# Patient Record
Sex: MALE | Race: OTHER | Employment: OTHER | URBAN - METROPOLITAN AREA
[De-identification: names, ages, dates, MRNs, and addresses within clinical notes are randomized per-mention and may not be internally consistent; named-entity substitution may affect disease eponyms.]

---

## 2017-09-10 ENCOUNTER — APPOINTMENT (OUTPATIENT)
Dept: GENERAL RADIOLOGY | Age: 42
End: 2017-09-10
Attending: NURSE PRACTITIONER
Payer: SELF-PAY

## 2017-09-10 ENCOUNTER — HOSPITAL ENCOUNTER (EMERGENCY)
Age: 42
Discharge: HOME | End: 2017-09-10
Attending: EMERGENCY MEDICINE
Payer: SELF-PAY

## 2017-09-10 VITALS
TEMPERATURE: 98.2 F | RESPIRATION RATE: 18 BRPM | SYSTOLIC BLOOD PRESSURE: 139 MMHG | HEIGHT: 68 IN | HEART RATE: 82 BPM | BODY MASS INDEX: 21.98 KG/M2 | DIASTOLIC BLOOD PRESSURE: 86 MMHG | OXYGEN SATURATION: 100 % | WEIGHT: 145 LBS

## 2017-09-10 DIAGNOSIS — R03.0 ELEVATED BLOOD PRESSURE READING: ICD-10-CM

## 2017-09-10 DIAGNOSIS — L98.499: Primary | ICD-10-CM

## 2017-09-10 DIAGNOSIS — R00.0 TACHYCARDIA: ICD-10-CM

## 2017-09-10 DIAGNOSIS — M79.632 LEFT FOREARM PAIN: ICD-10-CM

## 2017-09-10 LAB
ANION GAP SERPL CALC-SCNC: 8 MMOL/L (ref 3–18)
BASOPHILS # BLD: 0 K/UL (ref 0–0.06)
BASOPHILS NFR BLD: 0 % (ref 0–2)
BUN SERPL-MCNC: 14 MG/DL (ref 7–18)
BUN/CREAT SERPL: 19 (ref 12–20)
CALCIUM SERPL-MCNC: 9.1 MG/DL (ref 8.5–10.1)
CHLORIDE SERPL-SCNC: 101 MMOL/L (ref 100–108)
CO2 SERPL-SCNC: 28 MMOL/L (ref 21–32)
CREAT SERPL-MCNC: 0.75 MG/DL (ref 0.6–1.3)
DIFFERENTIAL METHOD BLD: NORMAL
EOSINOPHIL # BLD: 0.1 K/UL (ref 0–0.4)
EOSINOPHIL NFR BLD: 1 % (ref 0–5)
ERYTHROCYTE [DISTWIDTH] IN BLOOD BY AUTOMATED COUNT: 13.1 % (ref 11.6–14.5)
GLUCOSE SERPL-MCNC: 127 MG/DL (ref 74–99)
HCT VFR BLD AUTO: 40.2 % (ref 36–48)
HGB BLD-MCNC: 14 G/DL (ref 13–16)
LACTATE BLD-SCNC: 1.5 MMOL/L (ref 0.4–2)
LYMPHOCYTES # BLD: 1.9 K/UL (ref 0.9–3.6)
LYMPHOCYTES NFR BLD: 22 % (ref 21–52)
MCH RBC QN AUTO: 29.8 PG (ref 24–34)
MCHC RBC AUTO-ENTMCNC: 34.8 G/DL (ref 31–37)
MCV RBC AUTO: 85.5 FL (ref 74–97)
MONOCYTES # BLD: 0.6 K/UL (ref 0.05–1.2)
MONOCYTES NFR BLD: 6 % (ref 3–10)
NEUTS SEG # BLD: 6.4 K/UL (ref 1.8–8)
NEUTS SEG NFR BLD: 71 % (ref 40–73)
PLATELET # BLD AUTO: 209 K/UL (ref 135–420)
PMV BLD AUTO: 9.9 FL (ref 9.2–11.8)
POTASSIUM SERPL-SCNC: 4.3 MMOL/L (ref 3.5–5.5)
RBC # BLD AUTO: 4.7 M/UL (ref 4.7–5.5)
SODIUM SERPL-SCNC: 137 MMOL/L (ref 136–145)
WBC # BLD AUTO: 9 K/UL (ref 4.6–13.2)

## 2017-09-10 PROCEDURE — 83605 ASSAY OF LACTIC ACID: CPT

## 2017-09-10 PROCEDURE — 77030018836 HC SOL IRR NACL ICUM -A

## 2017-09-10 PROCEDURE — 74011000258 HC RX REV CODE- 258: Performed by: NURSE PRACTITIONER

## 2017-09-10 PROCEDURE — 87186 SC STD MICRODIL/AGAR DIL: CPT | Performed by: NURSE PRACTITIONER

## 2017-09-10 PROCEDURE — 87070 CULTURE OTHR SPECIMN AEROBIC: CPT | Performed by: NURSE PRACTITIONER

## 2017-09-10 PROCEDURE — 87040 BLOOD CULTURE FOR BACTERIA: CPT | Performed by: NURSE PRACTITIONER

## 2017-09-10 PROCEDURE — 73090 X-RAY EXAM OF FOREARM: CPT

## 2017-09-10 PROCEDURE — 96367 TX/PROPH/DG ADDL SEQ IV INF: CPT

## 2017-09-10 PROCEDURE — 85025 COMPLETE CBC W/AUTO DIFF WBC: CPT | Performed by: NURSE PRACTITIONER

## 2017-09-10 PROCEDURE — 74011250636 HC RX REV CODE- 250/636: Performed by: NURSE PRACTITIONER

## 2017-09-10 PROCEDURE — 87077 CULTURE AEROBIC IDENTIFY: CPT | Performed by: NURSE PRACTITIONER

## 2017-09-10 PROCEDURE — 96365 THER/PROPH/DIAG IV INF INIT: CPT

## 2017-09-10 PROCEDURE — 80048 BASIC METABOLIC PNL TOTAL CA: CPT | Performed by: NURSE PRACTITIONER

## 2017-09-10 PROCEDURE — 74011250637 HC RX REV CODE- 250/637: Performed by: NURSE PRACTITIONER

## 2017-09-10 PROCEDURE — 99283 EMERGENCY DEPT VISIT LOW MDM: CPT

## 2017-09-10 PROCEDURE — 96366 THER/PROPH/DIAG IV INF ADDON: CPT

## 2017-09-10 RX ORDER — CIPROFLOXACIN 500 MG/1
500 TABLET ORAL 2 TIMES DAILY
Qty: 20 TAB | Refills: 0 | Status: SHIPPED | OUTPATIENT
Start: 2017-09-10 | End: 2017-09-20

## 2017-09-10 RX ORDER — CIPROFLOXACIN 500 MG/1
500 TABLET ORAL
Status: COMPLETED | OUTPATIENT
Start: 2017-09-10 | End: 2017-09-10

## 2017-09-10 RX ORDER — IBUPROFEN 800 MG/1
800 TABLET ORAL
Qty: 20 TAB | Refills: 0 | Status: SHIPPED | OUTPATIENT
Start: 2017-09-10 | End: 2017-09-17

## 2017-09-10 RX ADMIN — SODIUM CHLORIDE 750 MG: 900 INJECTION, SOLUTION INTRAVENOUS at 14:58

## 2017-09-10 RX ADMIN — CIPROFLOXACIN HYDROCHLORIDE 500 MG: 500 TABLET, FILM COATED ORAL at 14:57

## 2017-09-10 RX ADMIN — CEFAZOLIN SODIUM 1 G: 1 INJECTION, POWDER, FOR SOLUTION INTRAMUSCULAR; INTRAVENOUS at 12:03

## 2017-09-10 RX ADMIN — SODIUM CHLORIDE 1000 MG: 900 INJECTION, SOLUTION INTRAVENOUS at 12:46

## 2017-09-10 NOTE — ED PROVIDER NOTES
HPI Comments: Michelle Urbina is a 43year old male who presents to the ED with a c/o non healing ulceration on the left forearm. Pt states it has been there for over a year. Admtis that it occurred from him skin popping narcotic agents. Adds that it was the same way on the opposing forearm, but it healed up and the left forearm did not. Patient is a 43 y.o. male presenting with abscess. The history is provided by the patient. History limited by: No communication barrier. Abscess    This is a new problem. Episode onset: Pt states the left forearm wound has been there for a year. The problem has not changed since onset. There has been no fever. Affected Location: left forearm. He has tried nothing (Pt has tried self-mangement.  ) for the symptoms. The treatment provided no relief. History reviewed. No pertinent past medical history. Past Surgical History:   Procedure Laterality Date    EGD  6/23/2015              History reviewed. No pertinent family history. Social History     Social History    Marital status: SINGLE     Spouse name: N/A    Number of children: N/A    Years of education: N/A     Occupational History    Not on file. Social History Main Topics    Smoking status: Current Every Day Smoker    Smokeless tobacco: Never Used    Alcohol use No    Drug use: Yes     Special: Marijuana, Heroin      Comment: last used 4/21/15    Sexual activity: Not on file     Other Topics Concern    Not on file     Social History Narrative         ALLERGIES: Review of patient's allergies indicates no known allergies. Review of Systems   Constitutional: Negative. HENT: Negative. Eyes: Negative. Respiratory: Negative. Cardiovascular: Negative. Gastrointestinal: Negative. Endocrine: Negative. Genitourinary: Negative. Musculoskeletal: Negative. Skin: Positive for wound (left forearm). Allergic/Immunologic: Negative. Neurological: Negative.     Hematological: Negative. Psychiatric/Behavioral: Negative. Vitals:    09/10/17 0959   BP: (!) 152/98   Pulse: (!) 110   Resp: 16   Temp: 98.2 °F (36.8 °C)   SpO2: 100%   Weight: 65.8 kg (145 lb)   Height: 5' 8\" (1.727 m)            Physical Exam   Constitutional: He is oriented to person, place, and time. He appears well-developed and well-nourished. No distress. HENT:   Head: Normocephalic and atraumatic. Eyes: Pupils are equal, round, and reactive to light. Neck: Normal range of motion. Neck supple. Cardiovascular: Normal rate and regular rhythm. Pulmonary/Chest: Effort normal and breath sounds normal. He has no wheezes. He has no rales. Abdominal: Soft. Bowel sounds are normal. There is no tenderness. There is no rebound. Genitourinary:   Genitourinary Comments: NE   Musculoskeletal: Normal range of motion. Neurological: He is alert and oriented to person, place, and time. No cranial nerve deficit. He exhibits normal muscle tone. Coordination normal.   Skin: Skin is warm and dry. Large ulcerated area on the left forearm that is not healing. There is evidence of black tissue contained within that may re(present tissue necrosis. This wound occupies the majority surface area of the left anterior forearm. There is no evidence of DC. There is no evidence of primary healing. The right forearm has a smaller and healing ulceration similar to the left forearm. Psychiatric: He has a normal mood and affect. Nursing note and vitals reviewed. MDM  Number of Diagnoses or Management Options  Elevated blood pressure reading:   Left forearm pain:   Skin ulcer of forearm, with unspecified severity ([address]):   Tachycardia:   Diagnosis management comments: CONSULT:    Genny from the laboratory called and stated the Pt has many gram negative rods growing. Landy Galeana, N  12:09 PM    PROGRESS NOTE:  I have discussed admission with the Pt.    I advised him that his XR findings are ominous, and needs to be admitted for IV ABX and care by the wound management team as well as orthopedics. The Pt states that he has social issues at home which preclude him from being admitted. States he has been walking around this way for over a year, and doesn't feel he needs to be admitted now. .  The Pt is capable of making his own rational decision as he is not impaired. I will DC the Pt to home with ABX, and a referral to the wound care clinic here in the hospital.  Charmayne Countryman, NP  3:37 PM           Amount and/or Complexity of Data Reviewed  Clinical lab tests: ordered and reviewed  Tests in the radiology section of CPT®: ordered and reviewed    Risk of Complications, Morbidity, and/or Mortality  Presenting problems: moderate  Diagnostic procedures: moderate  Management options: moderate    Patient Progress  Patient progress: stable    ED Course       Procedures      PROGRESS NOTE:  One or more blood pressure readings were noted elevated during the Pt's presentation in the emergency department this date. This abnormal reading has been cited in the Pt's diagnosis, and they have been encouraged to follow up with their primary care physician, or referred to a consultant for further evaluation and treatment. Charmayne Countryman, NP  3:40 PM    Diagnosis:   1. Skin ulcer of forearm, with unspecified severity ([address]    2. Left forearm pain    3. Elevated blood pressure reading    4. Tachycardia          Disposition:   DEPARTED THE ED AGAINST MEDICAL ADVICE. Follow-up Information     Follow up With Details Comments Contact Info    [address] Call in the morning to make an appointment. [address]  [phone]          Patient's Medications   Start Taking    CIPROFLOXACIN HCL (CIPRO) 500 MG TABLET    Take 1 Tab by mouth two (2) times a day for 10 days. IBUPROFEN (MOTRIN) 800 MG TABLET    Take 1 Tab by mouth every eight (8) hours as needed for Pain for up to 7 days.    Continue Taking No medications on file   These Medications have changed    No medications on file   Stop Taking    PROMETHAZINE (PHENERGAN) 25 MG TABLET    Take 1 Tab by mouth every eight (8) hours as needed.

## 2017-09-10 NOTE — ED NOTES
Abscess to left upper arm. Tissue is black/necrotic with some areas of pink. Purulent drainage noted on patients dressing.

## 2017-09-10 NOTE — DISCHARGE INSTRUCTIONS
[employer] Activation    Thank you for requesting access to [employer]. Please follow the instructions below to securely access and download your online medical record. [employer] allows you to send messages to your doctor, view your test results, renew your prescriptions, schedule appointments, and more. How Do I Sign Up? 1. In your internet browser, go to [email or web address]  2. Click on the First Time User? Click Here link in the Sign In box. You will be redirect to the New Member Sign Up page. 3. Enter your [employer] Access Code exactly as it appears below. You will not need to use this code after youve completed the sign-up process. If you do not sign up before the expiration date, you must request a new code. [employer] Access Code: [email or web address]  Expires: 2017  3:41 PM (This is the date your [employer] access code will )    4. Enter the last four digits of your Social Security Number (xxxx) and Date of Birth (mm/dd/yyyy) as indicated and click Submit. You will be taken to the next sign-up page. 5. Create a [employer] ID. This will be your [employer] login ID and cannot be changed, so think of one that is secure and easy to remember. 6. Create a [employer] password. You can change your password at any time. 7. Enter your Password Reset Question and Answer. This can be used at a later time if you forget your password. 8. Enter your e-mail address. You will receive e-mail notification when new information is available in [address]. 9. Click Sign Up. You can now view and download portions of your medical record. 10. Click the Download Summary menu link to download a portable copy of your medical information. Additional Information    If you have questions, please visit the Frequently Asked Questions section of the [employer] website at [email or web address]/. Remember, [employer] is NOT to be used for urgent needs. For medical emergencies, dial 911. Change dressing daily.   Take antibiotics as prescribed.

## 2017-09-10 NOTE — ED TRIAGE NOTES
Left arm infection x 1 year from IV drug abuse. Swollen and red and warm with foul drainage. ,had one on right arm that healed

## 2017-09-10 NOTE — PROGRESS NOTES
Day #1 of vancomycin  Indication:  osteomyelitis  Current regimen:  1750 mg IV once loading dose    Recent Labs      09/10/17   1110   WBC  9.0   CREA  0.75   BUN  14     Est CrCl: >100 ml/min  Temp (24hrs), Av.2 °F (36.8 °C), Min:98.2 °F (36.8 °C), Max:98.2 °F (36.8 °C)    Cultures: none at this time    Plan: Consult pharmacy for vancomycin maintenance dosing if needed.      Thanks,  Jasmin Smith, PHARMD

## 2017-09-13 LAB
BACTERIA SPEC CULT: ABNORMAL
GRAM STN SPEC: ABNORMAL
GRAM STN SPEC: ABNORMAL
SERVICE CMNT-IMP: ABNORMAL

## 2017-09-14 LAB
BACTERIA SPEC CULT: ABNORMAL
GRAM STN SPEC: ABNORMAL
SERVICE CMNT-IMP: ABNORMAL

## 2017-09-15 NOTE — PROGRESS NOTES
Attempted to call patient, no answer and unable to leave voicemail. Admission recommended at time of discharge, as refused. Positive blood cultures, however sensitivity to Cirpo which is what patient was prescribed. Concern for osteomyelitis  on xray of arm. Adam Walden NP.   Adam Walden, NP

## 2017-09-16 LAB
BACTERIA SPEC CULT: NORMAL
SERVICE CMNT-IMP: NORMAL

## 2017-09-22 ENCOUNTER — HOSPITAL ENCOUNTER (OUTPATIENT)
Dept: WOUND CARE | Age: 42
Discharge: HOME | End: 2017-09-22
Payer: SELF-PAY

## 2017-09-22 VITALS
DIASTOLIC BLOOD PRESSURE: 77 MMHG | OXYGEN SATURATION: 100 % | RESPIRATION RATE: 15 BRPM | HEART RATE: 97 BPM | TEMPERATURE: 97 F | SYSTOLIC BLOOD PRESSURE: 131 MMHG

## 2017-09-22 DIAGNOSIS — T87.89 NONHEALING AMPUTATION STUMP (HCC): ICD-10-CM

## 2017-09-22 PROBLEM — F19.90 IVDU (INTRAVENOUS DRUG USER): Status: ACTIVE | Noted: 2017-09-22

## 2017-09-22 PROBLEM — M86.9 OSTEOMYELITIS (HCC): Status: ACTIVE | Noted: 2017-09-22

## 2017-09-22 PROCEDURE — 77030011256 HC DRSG MEPILEX <16IN NO BORD MOLN -A: Performed by: INTERNAL MEDICINE

## 2017-09-22 PROCEDURE — 11042 DBRDMT SUBQ TIS 1ST 20SQCM/<: CPT

## 2017-09-22 PROCEDURE — 87070 CULTURE OTHR SPECIMN AEROBIC: CPT | Performed by: INTERNAL MEDICINE

## 2017-09-22 PROCEDURE — 74011250637 HC RX REV CODE- 250/637

## 2017-09-22 PROCEDURE — 87077 CULTURE AEROBIC IDENTIFY: CPT | Performed by: INTERNAL MEDICINE

## 2017-09-22 PROCEDURE — 87186 SC STD MICRODIL/AGAR DIL: CPT | Performed by: INTERNAL MEDICINE

## 2017-09-22 PROCEDURE — 74011000250 HC RX REV CODE- 250

## 2017-09-22 RX ORDER — CIPROFLOXACIN 750 MG/1
750 TABLET, FILM COATED ORAL 2 TIMES DAILY
Qty: 84 TAB | Refills: 0 | Status: SHIPPED | OUTPATIENT
Start: 2017-09-22 | End: 2017-11-03

## 2017-09-22 RX ORDER — LIDOCAINE AND PRILOCAINE 25; 25 MG/G; MG/G
CREAM TOPICAL
Status: COMPLETED
Start: 2017-09-22 | End: 2017-09-22

## 2017-09-22 RX ADMIN — Medication: at 13:06

## 2017-09-22 RX ADMIN — LIDOCAINE AND PRILOCAINE 2 TUBE: 25; 25 CREAM TOPICAL at 12:09

## 2017-09-22 NOTE — WOUND CARE
09/22/17 1134   Wound Arm Left   Date First Assessed: 09/22/17   POA: Yes  Location: Arm  Orientation: Left   DRESSING STATUS Removed   DRESSING TYPE Other (Comment)  (Coban,Telfa)   Non-Pressure Injury Full thickness (subcut/muscle)   Wound Length (cm) 8 cm   Wound Width (cm) 2.2 cm   Wound Depth (cm) 0.9   Wound Surface area (cm^3) 15.84 cm^2   Condition of Base Pink;Slough   Condition of Edges Open   Tissue Type Pink;Yellow   Tissue Type Percent Pink 5   Tissue Type Percent Yellow 95   Drainage Amount  Moderate   Drainage Color Purulent   Wound Odor None   Periwound Skin Condition Other (comment)  (Scar)   Cleansing and Cleansing Agents  Dermal wound cleanser   Dressing Type Applied Other (Comment)  (Iodosorb,Mepilex)   Procedure Tolerated Well     Patient given number for sandhya care application. Patient educated on proper way to cleanse wound and apply dressing (iodosorb and bandage), he verbalized an understanding and repeated back proper technique.

## 2017-09-26 LAB
BACTERIA SPEC CULT: ABNORMAL
GRAM STN SPEC: ABNORMAL
SERVICE CMNT-IMP: ABNORMAL

## 2017-09-27 PROBLEM — T87.89 NONHEALING AMPUTATION STUMP (HCC): Status: RESOLVED | Noted: 2017-09-22 | Resolved: 2017-09-27

## 2017-09-27 PROBLEM — T14.8XXA NON-HEALING NON-SURGICAL WOUND: Status: ACTIVE | Noted: 2017-09-27

## 2018-07-01 ENCOUNTER — APPOINTMENT (OUTPATIENT)
Dept: GENERAL RADIOLOGY | Age: 43
End: 2018-07-01
Attending: NURSE PRACTITIONER
Payer: SELF-PAY

## 2018-07-01 ENCOUNTER — HOSPITAL ENCOUNTER (EMERGENCY)
Age: 43
Discharge: HOME | End: 2018-07-02
Attending: EMERGENCY MEDICINE | Admitting: EMERGENCY MEDICINE
Payer: SELF-PAY

## 2018-07-01 VITALS
WEIGHT: 150 LBS | TEMPERATURE: 97.7 F | HEIGHT: 68 IN | SYSTOLIC BLOOD PRESSURE: 146 MMHG | RESPIRATION RATE: 16 BRPM | HEART RATE: 113 BPM | OXYGEN SATURATION: 97 % | BODY MASS INDEX: 22.73 KG/M2 | DIASTOLIC BLOOD PRESSURE: 92 MMHG

## 2018-07-01 DIAGNOSIS — T14.8XXA NON-HEALING NON-SURGICAL WOUND: ICD-10-CM

## 2018-07-01 DIAGNOSIS — L98.493: Primary | ICD-10-CM

## 2018-07-01 LAB — LACTATE BLD-SCNC: 1 MMOL/L (ref 0.4–2)

## 2018-07-01 PROCEDURE — 74011000250 HC RX REV CODE- 250: Performed by: NURSE PRACTITIONER

## 2018-07-01 PROCEDURE — 99283 EMERGENCY DEPT VISIT LOW MDM: CPT

## 2018-07-01 PROCEDURE — 74011000258 HC RX REV CODE- 258: Performed by: NURSE PRACTITIONER

## 2018-07-01 PROCEDURE — 83605 ASSAY OF LACTIC ACID: CPT

## 2018-07-01 PROCEDURE — 73090 X-RAY EXAM OF FOREARM: CPT

## 2018-07-01 PROCEDURE — 96365 THER/PROPH/DIAG IV INF INIT: CPT

## 2018-07-01 PROCEDURE — 74011250636 HC RX REV CODE- 250/636: Performed by: NURSE PRACTITIONER

## 2018-07-01 RX ORDER — IBUPROFEN 800 MG/1
800 TABLET ORAL
Qty: 20 TAB | Refills: 0 | Status: SHIPPED | OUTPATIENT
Start: 2018-07-01 | End: 2018-07-08

## 2018-07-01 RX ORDER — BACITRACIN 500 UNIT/G
10 PACKET (EA) TOPICAL
Status: COMPLETED | OUTPATIENT
Start: 2018-07-01 | End: 2018-07-01

## 2018-07-01 RX ORDER — CEPHALEXIN 500 MG/1
500 CAPSULE ORAL 4 TIMES DAILY
Qty: 56 CAP | Refills: 0 | Status: SHIPPED | OUTPATIENT
Start: 2018-07-01 | End: 2018-07-15

## 2018-07-01 RX ORDER — METHADONE HYDROCHLORIDE 10 MG/ML
50 CONCENTRATE ORAL
COMMUNITY
End: 2020-03-10

## 2018-07-01 RX ADMIN — BACITRACIN 10 PACKET: 500 OINTMENT TOPICAL at 22:30

## 2018-07-01 RX ADMIN — PIPERACILLIN SODIUM,TAZOBACTAM SODIUM 4.5 G: 4; .5 INJECTION, POWDER, FOR SOLUTION INTRAVENOUS at 20:21

## 2018-07-02 NOTE — DISCHARGE INSTRUCTIONS
[employer] Activation    Thank you for requesting access to [employer]. Please follow the instructions below to securely access and download your online medical record. [employer] allows you to send messages to your doctor, view your test results, renew your prescriptions, schedule appointments, and more. How Do I Sign Up? 1. In your internet browser, go to [email or web address]  2. Click on the First Time User? Click Here link in the Sign In box. You will be redirect to the New Member Sign Up page. 3. Enter your [employer] Access Code exactly as it appears below. You will not need to use this code after youve completed the sign-up process. If you do not sign up before the expiration date, you must request a new code. [employer] Access Code: [email or web address]  Expires: 2018  7:10 PM (This is the date your [employer] access code will )    4. Enter the last four digits of your Social Security Number (xxxx) and Date of Birth (mm/dd/yyyy) as indicated and click Submit. You will be taken to the next sign-up page. 5. Create a [employer] ID. This will be your [employer] login ID and cannot be changed, so think of one that is secure and easy to remember. 6. Create a [employer] password. You can change your password at any time. 7. Enter your Password Reset Question and Answer. This can be used at a later time if you forget your password. 8. Enter your e-mail address. You will receive e-mail notification when new information is available in [address]. 9. Click Sign Up. You can now view and download portions of your medical record. 10. Click the Download Summary menu link to download a portable copy of your medical information. Additional Information    If you have questions, please visit the Frequently Asked Questions section of the [employer] website at [email or web address] Sleep Number. [employer]/. Remember, [employer] is NOT to be used for urgent needs. For medical emergencies, dial 911.     Follow up in the morning with wound management.

## 2018-07-02 NOTE — ED NOTES
Cely Miller NP reviewed discharge instruction and prescriptions with patient. Patient verbalized understanding and has no further questions at this time. Education taught and patient verbalized understanding of education. Teach back method used. Left upper arm IV removed, catheter tip intact on removal.  Armband removed and shredded per patients request.    Patients pain 0/10. Belongings given to patient. Patient discharged with self to home.

## 2018-07-02 NOTE — ED PROVIDER NOTES
HPI Comments: Tomas David is a 37year old male who presents to the ED with a c/o large right forearm ulceration. States it has been there for six months, and he has been trying to treat it himself. Pt has been injected heroin in the arm, which caused it. Nothing has made it better, and it is getting worse over time. Patient is a 37 y.o. male presenting with skin problem. The history is provided by the patient. History limited by: No communication barrier. Skin Problem    This is a new problem. The problem has not changed since onset. There has been no fever. Affected Location: anterior right forearm. The pain is mild. The pain has been constant since onset. He has tried nothing for the symptoms. The treatment provided no relief. History reviewed. No pertinent past medical history. Past Surgical History:   Procedure Laterality Date    EGD  6/23/2015              History reviewed. No pertinent family history. Social History     Social History    Marital status: SINGLE     Spouse name: N/A    Number of children: N/A    Years of education: N/A     Occupational History    Not on file. Social History Main Topics    Smoking status: Current Every Day Smoker    Smokeless tobacco: Never Used    Alcohol use No    Drug use: Yes     Special: Marijuana, Heroin      Comment: last used 4/21/15    Sexual activity: Not on file     Other Topics Concern    Not on file     Social History Narrative         ALLERGIES: Review of patient's allergies indicates no known allergies. Review of Systems   Constitutional: Negative. HENT: Negative. Eyes: Negative. Respiratory: Negative. Cardiovascular: Negative. Gastrointestinal: Negative. Endocrine: Negative. Genitourinary: Negative. Musculoskeletal: Negative. Skin: Positive for wound (right forearm). Allergic/Immunologic: Negative. Neurological: Negative. Hematological: Negative. Psychiatric/Behavioral: Negative. Vitals:    07/01/18 1910   BP: (!) 146/92   Pulse: (!) 113   Resp: 16   Temp: 97.7 °F (36.5 °C)   SpO2: 97%   Weight: 68 kg (150 lb)   Height: 5' 8\" (1.727 m)            Physical Exam   Constitutional: He is oriented to person, place, and time. He appears well-developed and well-nourished. No distress. HENT:   Head: Normocephalic and atraumatic. Eyes: Pupils are equal, round, and reactive to light. Neck: Normal range of motion. Neck supple. Cardiovascular: Normal rate and regular rhythm. Pulmonary/Chest: Effort normal and breath sounds normal. He has no wheezes. He has no rales. Abdominal: Soft. Bowel sounds are normal.   Genitourinary:   Genitourinary Comments: NE   Musculoskeletal: Normal range of motion. Neurological: He is alert and oriented to person, place, and time. No cranial nerve deficit. He exhibits normal muscle tone. Coordination normal.   Skin: Skin is warm and dry. There is a large culceration to the anterior right forearm tat emcompases most of the surface of the forearm. The ulceration is down to the muscle area and there is significant weepage of fluid. There are also blackened areas of the wound. Psychiatric: He has a normal mood and affect. Nursing note and vitals reviewed. 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 Inj the strongest possible terms, I advised him to get admitted for wound care. He refused. I will DC the Pt to home with Keflex, and provide him a referral to wound management as an out patient here at [facility]. Thang Chris NP  11:36 PM    PROGRESS NOTE:  The plain kilm XR were reviewed of the forearm. No osteomyelitis was noted.    Thang Chris NP  11:36 PM               Amount and/or Complexity of Data Reviewed  Tests in the radiology section of CPT®: ordered and reviewed          ED Course       Procedures    Diagnosis:   1. 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 Non-healing non-surgical wound          Disposition:   Discharged to Home. Follow-up Information     Follow up With Details Comments Contact Info    Ameena Lewis MD Call in the morning for follow up. [address] OP WOUND CARE Call for an appointment. [address] [place] [address] Drive  [phone]          Patient's Medications   Start Taking    CEPHALEXIN (KEFLEX) 500 MG CAPSULE    Take 1 Cap by mouth four (4) times daily for 14 days. IBUPROFEN (MOTRIN) 800 MG TABLET    Take 1 Tab by mouth every six (6) hours as needed for Pain for up to 7 days. Continue Taking    METHADONE (DOLOPHINE) 10 MG/ML SOLUTION    Take 50 mg by mouth.    These Medications have changed    No medications on file   Stop Taking    No medications on file

## 2018-09-07 ENCOUNTER — HOSPITAL ENCOUNTER (EMERGENCY)
Age: 43
Discharge: HOME | End: 2018-09-08
Attending: EMERGENCY MEDICINE | Admitting: EMERGENCY MEDICINE
Payer: SELF-PAY

## 2018-09-07 ENCOUNTER — APPOINTMENT (OUTPATIENT)
Dept: GENERAL RADIOLOGY | Age: 43
End: 2018-09-07
Attending: EMERGENCY MEDICINE
Payer: SELF-PAY

## 2018-09-07 VITALS
OXYGEN SATURATION: 98 % | HEART RATE: 83 BPM | DIASTOLIC BLOOD PRESSURE: 93 MMHG | TEMPERATURE: 98.1 F | HEIGHT: 68 IN | BODY MASS INDEX: 22.73 KG/M2 | RESPIRATION RATE: 14 BRPM | WEIGHT: 150 LBS | SYSTOLIC BLOOD PRESSURE: 143 MMHG

## 2018-09-07 DIAGNOSIS — F19.90 IVDU (INTRAVENOUS DRUG USER): ICD-10-CM

## 2018-09-07 DIAGNOSIS — T14.8XXA NON-HEALING NON-SURGICAL WOUND: Primary | ICD-10-CM

## 2018-09-07 PROCEDURE — 73090 X-RAY EXAM OF FOREARM: CPT

## 2018-09-07 PROCEDURE — 99281 EMR DPT VST MAYX REQ PHY/QHP: CPT

## 2018-09-08 RX ORDER — CEPHALEXIN 500 MG/1
500 CAPSULE ORAL 4 TIMES DAILY
Qty: 40 CAP | Refills: 0 | Status: SHIPPED | OUTPATIENT
Start: 2018-09-08 | End: 2018-09-18

## 2018-09-08 RX ORDER — SULFAMETHOXAZOLE AND TRIMETHOPRIM 800; 160 MG/1; MG/1
1 TABLET ORAL 2 TIMES DAILY
Qty: 20 TAB | Refills: 0 | Status: SHIPPED | OUTPATIENT
Start: 2018-09-08 | End: 2018-09-18

## 2018-09-08 NOTE — ED PROVIDER NOTES
EMERGENCY DEPARTMENT HISTORY AND PHYSICAL EXAM 
 
12:37 AM 
 
 
Date: 9/7/2018 Patient Name: Rajesh Meyer History of Presenting Illness Chief Complaint Patient presents with  Wound Infection History Provided By: Patient Chief Complaint: Wound Duration:  2.5 days Timing:  Chronic Location: Right forearm Quality: N/A Severity: Severe Modifying Factors: The patient states he has been on antibiotics in the past with improvement, but he states he has been agitating the area. Associated Symptoms: Fever and chills. Denies cough and other cold symptoms. Additional History (Context): Rajesh Meyer is a 37 y.o. male with a history of IV heroin use who presents with a chronic severe wound of the right forearm from an abscess over a year ago. The patient states the area has been swelling for the past 2.5 days as he has been agitating the area. The site had improved when he was on antibiotics a 2 months ago after being seen at [facility] ED. The patient has associated symptoms of fever and chills. Denies cough and other cold symptoms. Denies alcohol use. Smokes tobacco. The patient continues to use heroin IV. The patient states he was previously in a methadone program, but not currently. PCP: None Current Outpatient Prescriptions Medication Sig Dispense Refill  cephALEXin (KEFLEX) 500 mg capsule Take 1 Cap by mouth four (4) times daily for 10 days. 40 Cap 0  
 trimethoprim-sulfamethoxazole (BACTRIM DS) 160-800 mg per tablet Take 1 Tab by mouth two (2) times a day for 10 days. 20 Tab 0  
 methadone (DOLOPHINE) 10 mg/mL solution Take 50 mg by mouth. Past History Past Medical History: 
History reviewed. No pertinent past medical history. Past Surgical History: 
Past Surgical History:  
Procedure Laterality Date  EGD  6/23/2015 Family History: 
History reviewed. No pertinent family history. Social History: 
Social History Substance Use Topics  Smoking status: Current Every Day Smoker  Smokeless tobacco: Never Used  Alcohol use No  
 
 
Allergies: 
No Known Allergies Review of Systems Review of Systems Constitutional: Positive for chills and fever. Respiratory: Negative for cough. Skin: Positive for wound. All other systems reviewed and are negative. Visit Vitals  BP (!) 143/93 (BP 1 Location: Left arm, BP Patient Position: Sitting)  Pulse 83  Temp 98.1 °F (36.7 °C)  Resp 14  
 Ht 5' 8\" (1.727 m)  Wt 68 kg (150 lb)  SpO2 98%  BMI 22.81 kg/m2 Physical Exam / Medical Decision Making I am the first provider for this patient. I reviewed the vital signs, available nursing notes, past medical history, past surgical history, family history and social history. Vital Signs-Reviewed the patient's vital signs. Physical exam: 
General:  Well-developed, well-nourished, nontoxic nondiaphoretic Head:  Normocephalic atraumatic Eyes:  Pupils midrange extraocular movements grossly intact. Nose:  No rhinorrhea, inspection grossly normal   
Ears:  Grossly normal to inspection Mouth:  Mucous membranes moist   
Neck/Back:  Trachea midline, no asymmetry Chest:  Grossly normal inspection, symmetric chest rise, respirations nonlabored Extremities:  Grossly normal to inspection  old skin popping scars, well-healed scar of the LEFT forearm, RIGHT forearm with a large patch of stage IV ulcer. There is minimal erythema and swelling distal to this. 2+ radial pulse no subcutaneous emphysema no pain past the area of erythema Neurologic:  Alert and oriented no appreciable focal neurologic deficit Psychiatric:  Grossly normal mood and affect Nursing note reviewed, vital signs reviewed. ED course: 
Patient presents with a nonhealing wound of his RIGHT forearm times one year. This is reportedly a sequelae from his IVDA and a missed injection. His persistently been picking at this and continues to use heroin, he was offered labs and declined this he would rather be discharged immediately with antibiotics. 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 No indication of force him against his will he is afebrile and not tachycardic no SIRS search criteria we'll discharge with Bactrim and Keflex and strict return precautions wound care follow-up and PCP Patient wishes to utilize shared decision making in order to pursue an alternative course of treatment: 
Judgement and insight seem to be intact Patient not intoxicated Family present: None Clinical status/evaluation: Chronic wound of the RIGHT forearm Risks of pursuing alternative course of treatment:  Death, disability, chronic pain Alternatives offered: Follow up with PCP, return to ED at any time for further treatment/evaluation Discussion was witnessed by:  MAIK Bermeo Patient is allowed to choose their path of medical management this time. No indication to force the patient against her will to pursue my recommended course of action. Patient was given strict return precautions, was invited to return to the ED with any concerns whatsoever or if they choose to pursue the previously recommended course of action. Disposition: 
 
Discharged utilizing shared decision making Portions of this chart were created with Dragon medical speech to text program.   Unrecognized errors may be present. Alvaro Saavedra Diagnostic Study Results Labs - No results found for this or any previous visit (from the past 12 hour(s)). Radiologic Studies -  
XR FOREARM RT AP/LAT    (Results Pending) Diagnosis Clinical Impression: 1. Non-healing non-surgical wound 2. IVDU (intravenous drug user) Follow-up Information Follow up With Details Comments Contact Info [facility] OP WOUND CARE Call in 2 days  TAMIKA/Danyel Dang 
2nd Floor [address] ([facility]) [address] [phone] [address] Call in 2 days  [place] [address] [address] ([facility]) [address] [phone] [facility] EMERGENCY DEPT  As needed, If symptoms worsen [address] 
[phone] Discharge Medication List as of 9/8/2018 12:50 AM  
  
START taking these medications Details  
cephALEXin (KEFLEX) 500 mg capsule Take 1 Cap by mouth four (4) times daily for 10 days. , Print, Disp-40 Cap, R-0  
  
trimethoprim-sulfamethoxazole (BACTRIM DS) 160-800 mg per tablet Take 1 Tab by mouth two (2) times a day for 10 days. , Print, Disp-20 Tab, R-0  
  
  
CONTINUE these medications which have NOT CHANGED Details  
methadone (DOLOPHINE) 10 mg/mL solution Take 50 mg by mouth., Historical Med  
  
  
 
_______________________________ Attestations: 
Scribe Attestation Trina Heart acting as a scribe for and in the presence of Kayla Mckeon MD     
September 08, 2018 at 2:20 AM 
    
Provider Attestation:     
I personally performed the services described in the documentation, reviewed the documentation, as recorded by the scribe in my presence, and it accurately and completely records my words and actions. September 08, 2018 at 2:20 AM - Kayla Mckeon MD   
_______________________________

## 2018-09-08 NOTE — DISCHARGE INSTRUCTIONS
Learning About Drug Misuse  What is drug misuse? Drug misuse means using drugs in a way that harms you or causes you to harm others. Your doctor may call it substance use disorder or drug abuse. It's possible to misuse almost any type of drug, including illegal drugs, prescription drugs, and over-the-counter drugs. An overdose happens when a person takes more than the normal or recommended amount of a drug. An overdose can result in harmful symptoms or death. Could you have a problem? If there's a chance you may be misusing drugs, it's important to find out. So ask yourself a few questions. · Do you spend a lot of time thinking about your medicine or other drug-getting it and using it? Has that taken the place of other things you used to enjoy? · Have you had problems with your family or friends, or at work, because of your use? · Do you use drugs even when you've told yourself you won't? Do you think you might have a problem? If you do, then you've just taken an important first step. Many people have overcome this problem. And most of them started by reaching out to others, like caring friends or family, their doctor, or a support group. How is drug misuse treated? If you think you may have a problem with drugs, talk to your doctor. You and your doctor can decide whether you have a problem and what type of treatment might help you. You may need to stay in a hospital at first, so that you can be treated for withdrawal symptoms. One of the goals of treatment is helping you get used to life without the drug. Counseling can help you prepare for people or situations that might tempt you to start using again. You can practice these skills through one-on-one counseling, family therapy, or group therapy. Therapy may be part of inpatient treatment, where you stay in a treatment center.  Or it may be part of outpatient treatment, where you can fit your therapy around your job or other responsibilities. Another goal of treatment is getting ongoing support for your drug-free life. Many people find support by going to meetings like Narcotics Anonymous or SMART Recovery. This type of support can help you feel less alone and more motivated to stay drug-free. You might talk to your doctor or do an online search for local treatment programs. Or you might tell a friend or loved one that you need help. Follow-up care is a key part of your treatment and safety. Be sure to make and go to all appointments, and call your doctor if you are having problems. It's also a good idea to know your test results and keep a list of the medicines you take. Where can you learn more? Go to http://susana-lalita.info/. Enter 428-286-1709 in the search box to learn more about \"Learning About Drug Misuse. \"  Current as of: October 9, 2017  Content Version: 11.7  © 5650-9106 [employer]. Care instructions adapted under license by [employer] (which disclaims liability or warranty for this information). If you have questions about a medical condition or this instruction, always ask your healthcare professional. [place] [address] any warranty or liability for your use of this information.

## 2018-09-08 NOTE — ED NOTES
I have reviewed discharge instructions with the patient. Patient armband removed and given to patient to take home. Patient was informed of the privacy risks if armband lost or stolen

## 2019-05-08 ENCOUNTER — OFFICE VISIT (OUTPATIENT)
Dept: FAMILY MEDICINE CLINIC | Age: 44
End: 2019-05-08

## 2019-05-08 VITALS
DIASTOLIC BLOOD PRESSURE: 79 MMHG | HEART RATE: 100 BPM | WEIGHT: 159.6 LBS | RESPIRATION RATE: 18 BRPM | BODY MASS INDEX: 23.64 KG/M2 | SYSTOLIC BLOOD PRESSURE: 149 MMHG | TEMPERATURE: 97.5 F | HEIGHT: 69 IN | OXYGEN SATURATION: 98 %

## 2019-05-08 DIAGNOSIS — S41.101A OPEN WOUND OF RIGHT UPPER EXTREMITY WITH TENDON INJURY, INITIAL ENCOUNTER: ICD-10-CM

## 2019-05-08 DIAGNOSIS — N50.89 TESTICULAR MASS: Primary | ICD-10-CM

## 2019-05-08 DIAGNOSIS — S46.901A OPEN WOUND OF RIGHT UPPER EXTREMITY WITH TENDON INJURY, INITIAL ENCOUNTER: ICD-10-CM

## 2019-05-08 RX ORDER — CEPHALEXIN 500 MG/1
500 CAPSULE ORAL 4 TIMES DAILY
Qty: 40 CAP | Refills: 0 | Status: SHIPPED | OUTPATIENT
Start: 2019-05-08 | End: 2019-05-18

## 2019-05-08 RX ORDER — DOXYCYCLINE 100 MG/1
100 CAPSULE ORAL 2 TIMES DAILY
Qty: 20 CAP | Refills: 0 | Status: SHIPPED | OUTPATIENT
Start: 2019-05-08 | End: 2019-05-18

## 2019-05-08 NOTE — PATIENT INSTRUCTIONS
Testicular Mass: Care Instructions  Your Care Instructions    A lump or mass in your testicle may be a minor problem, or it may be more serious. Minor causes include a buildup of fluid, a cyst, or a varicose vein in your scrotum. More serious causes include infection or a tumor. You may have an ultrasound, an X-ray, or a CT scan to find out what is causing the mass. Or you may have a blood test.  Follow-up care is a key part of your treatment and safety. Be sure to make and go to all appointments, and call your doctor if you are having problems. It's also a good idea to know your test results and keep a list of the medicines you take. How can you care for yourself at home? · Take your medicine exactly as prescribed. If your doctor prescribed antibiotics, take them as directed. Do not stop taking them just because you feel better. You need to take the full course of antibiotics. · Be safe with medicines. Take pain medicines exactly as directed. ? If the doctor gave you a prescription medicine for pain, take it as prescribed. ? If you are not taking a prescription pain medicine, take an over-the-counter medicine such as acetaminophen (Tylenol), ibuprofen (Advil, Motrin), or naproxen (Aleve). Read and follow all instructions on the label. ? Do not take two or more pain medicines at the same time unless the doctor told you to. Many pain medicines have acetaminophen, which is Tylenol. Too much acetaminophen (Tylenol) can be harmful. · Learn how to check your testicles so you can see if the lump changes. When should you call for help? Call your doctor now or seek immediate medical care if:    · You have severe pain in a testicle. It could be a sign of a twisted testicle.  This is an emergency.     · You have new or worsening pain in a testicle.    Watch closely for changes in your health, and be sure to contact your doctor if:    · You have a new or growing mass in a testicle.     · You see a change in a testicle.     · You are not getting better as expected. Where can you learn more? Go to http://susana-lalita.info/. Enter S192 in the search box to learn more about \"Testicular Mass: Care Instructions. \"  Current as of: March 20, 2018  Content Version: 11.9  © 9707-2764 [employer]. Care instructions adapted under license by [employer] (which disclaims liability or warranty for this information). If you have questions about a medical condition or this instruction, always ask your healthcare professional. [address] any warranty or liability for your use of this information. Wound Care: After Your Visit  Your Care Instructions  Taking good care of your wound at home will help it heal quickly and reduce your chance of infection. The doctor has checked you carefully, but problems can develop later. If you notice any problems or new symptoms, get medical treatment right away. Follow-up care is a key part of your treatment and safety. Be sure to make and go to all appointments, and call your doctor if you are having problems. It's also a good idea to know your test results and keep a list of the medicines you take. How can you care for yourself at home? · Clean the area with soap and water 2 times a day unless your doctor gives you different instructions. Don't use hydrogen peroxide or alcohol, which can slow healing. ¨ You may cover the wound with a thin layer of antibiotic ointment, such as bacitracin, and a nonstick bandage. ¨ Apply more ointment and replace the bandage as needed. · Take pain medicines exactly as directed. Some pain is normal with a wound, but do not ignore pain that is getting worse instead of better. You could have an infection. ¨ If the doctor gave you a prescription medicine for pain, take it as prescribed.   ¨ If you are not taking a prescription pain medicine, ask your doctor if you can take an over-the-counter medicine. · Your doctor may have closed your wound with stitches (sutures), staples, or skin glue. ¨ If you have stitches, your doctor may remove them after several days to 2 weeks. Or you may have stitches that dissolve on their own. ¨ If you have staples, your doctor may remove them after 7 to 10 days. ¨ If your wound was closed with skin glue, the glue will wear off in a few days to 2 weeks. When should you call for help? Call your doctor now or seek immediate medical care if:  · You have signs of infection, such as:  ¨ Increased pain, swelling, warmth, or redness near the wound. ¨ Red streaks leading from the wound. ¨ Pus draining from the wound. ¨ A fever. · You bleed so much from your incision that you soak one or more bandages over 2 to 4 hours. Watch closely for changes in your health, and be sure to contact your doctor if:  · The wound is not getting better each day. Where can you learn more? Go to 1Ring.be  Enter M973 in the search box to learn more about \"Wound Care: After Your Visit. \"   © 9707-7823 [employer]. Care instructions adapted under license by [employer] (which disclaims liability or warranty for this information). This care instruction is for use with your licensed healthcare professional. If you have questions about a medical condition or this instruction, always ask your healthcare professional. [place] [address] any warranty or liability for your use of this information. Content Version: 69.5.217780;  Last Revised: April 23, 2012

## 2019-05-08 NOTE — PROGRESS NOTES
Kenya Cousin is a  40 y.o. male presents today for office visit for established care    1. Have you been to the ER, urgent care clinic or hospitalized since your last visit? NO    2. Have you seen or consulted any other health care providers outside of the [address] [place] [address] since your last visit (Include any pap smears or colon screening)? NO

## 2019-05-08 NOTE — PROGRESS NOTES
ABEL Stover is a 40 y.o. male who presents today for testicular lump that has been present for a month. Pt states it has increased in size, denies pain with urination. Pt states he has a abscess to right arm that has been present and recurrent since October he states it has drained and has an odor. Denies fever, chills, nausea/vomiting, and diarrhea. Pt states in the past his has tried lancing the abscess with straight razor, cleanses the wound daily with alcohol and keeps it covered with gauze and tape with elastic sleeve. He also uses bacitracin. No Known Allergies    SUBJECTIVE:  Review of Systems   Constitutional: Negative for chills, fever and malaise/fatigue. Eyes: Negative for blurred vision. Respiratory: Negative for shortness of breath. Cardiovascular: Negative for chest pain, palpitations and leg swelling. Gastrointestinal: Negative for abdominal pain, diarrhea, nausea and vomiting. Genitourinary: Negative for dysuria, frequency and urgency. Lump on right testicle   Musculoskeletal: Negative for myalgias. Skin:        recurrent abscess on arms   Neurological: Negative for dizziness, tingling, sensory change and headaches. OBJECTIVE:  Visit Vitals  /79   Pulse 100   Temp 97.5 °F (36.4 °C) (Oral)   Resp 18   Ht 5' 8.9\" (1.75 m)   Wt 159 lb 9.6 oz (72.4 kg)   SpO2 98%   BMI 23.64 kg/m²        Physical Exam   Constitutional: He is oriented to person, place, and time and well-developed, well-nourished, and in no distress. HENT:   Head: Normocephalic. Eyes: Pupils are equal, round, and reactive to light. Conjunctivae and EOM are normal.   Cardiovascular: Normal rate, regular rhythm and normal heart sounds. Pulmonary/Chest: Effort normal and breath sounds normal. He has no wheezes. He has no rales. He exhibits no tenderness. Genitourinary:   Genitourinary Comments: testicular mass right side   Musculoskeletal: He exhibits no edema.    Neurological: He is alert and oriented to person, place, and time. Gait normal.   Skin: Skin is warm and dry. There is erythema (large open wound to right upper arm, foul odor, slough present). Psychiatric: His mood appears anxious. He does not exhibit a depressed mood. He expresses no homicidal and no suicidal ideation. Nursing note and vitals reviewed. ASSESSMENT:  Diagnoses and all orders for this visit:    1. Testicular mass  -     REFERRAL TO UROLOGY    2. Open wound of right upper extremity with tendon injury, initial encounter  -     REFERRAL TO GENERAL SURGERY  -     doxycycline (MONODOX) 100 mg capsule; Take 1 Cap by mouth two (2) times a day for 10 days. -     cephALEXin (KEFLEX) 500 mg capsule; Take 1 Cap by mouth four (4) times daily for 10 days. PLAN:  Start Keflex and Doxycycline   Referral placed for urology  Referral for general surgery for possible debridement    I have discussed the diagnosis with the patient and the intended plan as seen in the above orders. The patient has received an after-visit summary and questions were answered concerning future plans. I have discussed medication side effects and warnings with the patient as well. Patient will call for further questions. Follow-up and Dispositions    · Return in about 6 weeks (around 6/19/2019) for follow up.          Edmon Severe, NP

## 2019-06-11 ENCOUNTER — OFFICE VISIT (OUTPATIENT)
Dept: UROLOGY | Age: 44
End: 2019-06-11

## 2019-06-11 VITALS
HEART RATE: 74 BPM | WEIGHT: 160 LBS | BODY MASS INDEX: 23.7 KG/M2 | DIASTOLIC BLOOD PRESSURE: 74 MMHG | SYSTOLIC BLOOD PRESSURE: 120 MMHG | OXYGEN SATURATION: 94 % | HEIGHT: 69 IN

## 2019-06-11 DIAGNOSIS — N50.89 TESTICULAR MASS: Primary | ICD-10-CM

## 2019-06-11 DIAGNOSIS — N50.89 SCROTAL MASS: Primary | ICD-10-CM

## 2019-06-11 LAB
BILIRUB UR QL STRIP: NEGATIVE
BILIRUB UR QL STRIP: NORMAL
GLUCOSE UR-MCNC: NEGATIVE MG/DL
GLUCOSE UR-MCNC: NORMAL MG/DL
KETONES P FAST UR STRIP-MCNC: NEGATIVE MG/DL
KETONES P FAST UR STRIP-MCNC: NORMAL MG/DL
PH UR STRIP: 5.5 [PH] (ref 4.6–8)
PH UR STRIP: NORMAL [PH] (ref 4.6–8)
PROT UR QL STRIP: NEGATIVE
PROT UR QL STRIP: NORMAL
SP GR UR STRIP: 1.02 (ref 1–1.03)
SP GR UR STRIP: NORMAL (ref 1–1.03)
UA UROBILINOGEN AMB POC: NORMAL (ref 0.2–1)
UA UROBILINOGEN AMB POC: NORMAL (ref 0.2–1)
URINALYSIS CLARITY POC: CLEAR
URINALYSIS CLARITY POC: NORMAL
URINALYSIS COLOR POC: NORMAL
URINALYSIS COLOR POC: YELLOW
URINE BLOOD POC: NEGATIVE
URINE BLOOD POC: NORMAL
URINE LEUKOCYTES POC: NEGATIVE
URINE LEUKOCYTES POC: NORMAL
URINE NITRITES POC: NEGATIVE
URINE NITRITES POC: NORMAL

## 2019-06-11 NOTE — PROGRESS NOTES
Early [facility]    Chief Complaint   Patient presents with   [address] [place] [address]    Testicular Mass       History and Physical    The patient is a 69-year-old male  with a 1 month history of right scrotal change. He never had this before and had it go away. It came on without preceding trauma or exertion and there is no associated urinary symptoms. The patient denies any surgery in this area including but not limited to vasectomy or hernia repair. History reviewed. No pertinent past medical history. Patient Active Problem List   Diagnosis Code    Nausea & vomiting R11.2    Osteomyelitis ([address] M86.9    IVDU (intravenous drug user) F19.90    Non-healing non-surgical wound T14. 8XXA     Past Surgical History:   Procedure Laterality Date    EGD  6/23/2015          Current Outpatient Medications   Medication Sig Dispense Refill    methadone (DOLOPHINE) 10 mg/mL solution Take 50 mg by mouth.        No Known Allergies  Social History     Socioeconomic History    Marital status:      Spouse name: Not on file    Number of children: Not on file    Years of education: Not on file    Highest education level: Not on file   Occupational History    Not on file   Social Needs    Financial resource strain: Not on file    Food insecurity:     Worry: Not on file     Inability: Not on file    Transportation needs:     Medical: Not on file     Non-medical: Not on file   Tobacco Use    Smoking status: Current Every Day Smoker     Packs/day: 1.00     Years: 30.00     Pack years: 30.00    Smokeless tobacco: Never Used    Tobacco comment: Patient states that he would to quit   Substance and Sexual Activity    Alcohol use: Not Currently     Frequency: Never     Comment: quit 2004    Drug use: Yes     Types: Heroin, Marijuana     Comment: last used 4/21/15    Sexual activity: Yes     Partners: Female     Birth control/protection: None   Lifestyle    Physical activity:     Days per week: Not on file Minutes per session: Not on file    Stress: Not on file   Relationships    Social connections:     Talks on phone: Not on file     Gets together: Not on file     Attends [employer] service: Not on file     Active member of club or organization: Not on file     Attends meetings of clubs or organizations: Not on file     Relationship status: Not on file    Intimate partner violence:     Fear of current or ex partner: Not on file     Emotionally abused: Not on file     Physically abused: Not on file     Forced sexual activity: Not on file   Other Topics Concern    Not on file   Social History Narrative    ** Merged History Encounter **           Family History   Problem Relation Age of Onset    Diabetes Mother     Cancer Father                  Visit Vitals  /74 (BP 1 Location: Left arm, BP Patient Position: Sitting)   Pulse 74   Ht 5' 8.9\" (1.75 m)   Wt 160 lb (72.6 kg)   SpO2 94%   BMI 23.70 kg/m²     Physical        Gen: WDWN adult NAD  Head  : normocephalic,  Normal ROM; eyes with normal pupils, EOMs, no masses;  conjunctiva normal  Neck: normal movement,  no evident mass,  No evident adenopathy, trachea midline,  Lungs: no respiratory distress or difficulties  CV:  No evident peripheral swelling  Abd :bowel sounds normal, no masses, tenderness, organomegaly  Flanks     -the penis is circumcised and normal.  The left scrotal contents reveal normal size and texture of testes with normal epididymis and normal spermatic cord although it is a bit thicker at the bottom.   On the right side the testicle is normal.  The globus major on the right side is indistinct and there is a transverse oriented ballotable nontender soft mass to be in a location consistent with a spermatocele    Extremities- no edema, arthritis, deformity, swelling  Psych- oriented, no evident anxiety, no cognitive impairment evident        Urine analysis is negative          Impression/ PLAN  Right scrotal mass of 1 month duration without pain or trauma. The location is suggestive of a spermatocele but the configuration is somewhat atypical    Plan:  Anatomy described to the patient. I am going to obtain a scrotal ultrasound and the patient will return for assessment. We need to get a baseline PSA on the patient            This visit exceeded 30 minutes and >50% was counselling  The patient understands the discussion and plan    PLEASE NOTE:      This document has been produced using voice recognition software.   Unrecognized errors in transcription may be present    Lorena Okeefe MD

## 2019-06-11 NOTE — PROGRESS NOTES
Mr. Maria Guadalupe Toribio has a reminder for a \"due or due soon\" health maintenance. I have asked that he contact his primary care provider for follow-up on this health maintenance.

## 2019-06-11 NOTE — PROGRESS NOTES
Mr. Ariel Lopes has a reminder for a \"due or due soon\" health maintenance. I have asked that he contact his primary care provider for follow-up on this health maintenance.

## 2019-06-11 NOTE — PATIENT INSTRUCTIONS
Testicular Mass: Care Instructions  Your Care Instructions    A lump or mass in your testicle may be a minor problem, or it may be more serious. Minor causes include a buildup of fluid, a cyst, or a varicose vein in your scrotum. More serious causes include infection or a tumor. You may have an ultrasound, an X-ray, or a CT scan to find out what is causing the mass. Or you may have a blood test.  Follow-up care is a key part of your treatment and safety. Be sure to make and go to all appointments, and call your doctor if you are having problems. It's also a good idea to know your test results and keep a list of the medicines you take. How can you care for yourself at home? · Take your medicine exactly as prescribed. If your doctor prescribed antibiotics, take them as directed. Do not stop taking them just because you feel better. You need to take the full course of antibiotics. · Be safe with medicines. Take pain medicines exactly as directed. ? If the doctor gave you a prescription medicine for pain, take it as prescribed. ? If you are not taking a prescription pain medicine, take an over-the-counter medicine such as acetaminophen (Tylenol), ibuprofen (Advil, Motrin), or naproxen (Aleve). Read and follow all instructions on the label. ? Do not take two or more pain medicines at the same time unless the doctor told you to. Many pain medicines have acetaminophen, which is Tylenol. Too much acetaminophen (Tylenol) can be harmful. · Learn how to check your testicles so you can see if the lump changes. When should you call for help? Call your doctor now or seek immediate medical care if:    · You have severe pain in a testicle. It could be a sign of a twisted testicle.  This is an emergency.     · You have new or worsening pain in a testicle.    Watch closely for changes in your health, and be sure to contact your doctor if:    · You have a new or growing mass in a testicle.     · You see a change in a testicle.     · You are not getting better as expected. Where can you learn more? Go to http://susana-lalita.info/. Enter W842 in the search box to learn more about \"Testicular Mass: Care Instructions. \"  Current as of: March 20, 2018  Content Version: 11.9  © 2927-0266 [email or web address]. Care instructions adapted under license by [employer] (which disclaims liability or warranty for this information). If you have questions about a medical condition or this instruction, always ask your healthcare professional. [address] any warranty or liability for your use of this information.

## 2019-06-11 NOTE — PATIENT INSTRUCTIONS
Testicular Mass: Care Instructions  Your Care Instructions    A lump or mass in your testicle may be a minor problem, or it may be more serious. Minor causes include a buildup of fluid, a cyst, or a varicose vein in your scrotum. More serious causes include infection or a tumor. You may have an ultrasound, an X-ray, or a CT scan to find out what is causing the mass. Or you may have a blood test.  Follow-up care is a key part of your treatment and safety. Be sure to make and go to all appointments, and call your doctor if you are having problems. It's also a good idea to know your test results and keep a list of the medicines you take. How can you care for yourself at home? · Take your medicine exactly as prescribed. If your doctor prescribed antibiotics, take them as directed. Do not stop taking them just because you feel better. You need to take the full course of antibiotics. · Be safe with medicines. Take pain medicines exactly as directed. ? If the doctor gave you a prescription medicine for pain, take it as prescribed. ? If you are not taking a prescription pain medicine, take an over-the-counter medicine such as acetaminophen (Tylenol), ibuprofen (Advil, Motrin), or naproxen (Aleve). Read and follow all instructions on the label. ? Do not take two or more pain medicines at the same time unless the doctor told you to. Many pain medicines have acetaminophen, which is Tylenol. Too much acetaminophen (Tylenol) can be harmful. · Learn how to check your testicles so you can see if the lump changes. When should you call for help? Call your doctor now or seek immediate medical care if:    · You have severe pain in a testicle. It could be a sign of a twisted testicle.  This is an emergency.     · You have new or worsening pain in a testicle.    Watch closely for changes in your health, and be sure to contact your doctor if:    · You have a new or growing mass in a testicle.     · You see a change in a testicle.     · You are not getting better as expected. Where can you learn more? Go to http://susana-lalita.info/. Enter F488 in the search box to learn more about \"Testicular Mass: Care Instructions. \"  Current as of: March 20, 2018  Content Version: 11.9  © 5576-4525 "[employer]. Care instructions adapted under license by [employer] (which disclaims liability or warranty for this information). If you have questions about a medical condition or this instruction, always ask your healthcare professional. [address] any warranty or liability for your use of this information.

## 2019-06-21 ENCOUNTER — TELEPHONE (OUTPATIENT)
Dept: FAMILY MEDICINE CLINIC | Age: 44
End: 2019-06-21

## 2019-06-21 NOTE — TELEPHONE ENCOUNTER
Patient calling stated he was supposed to be getting some type of test done but there is no order. Not sure which test it is. Patient is asking for a call back.      pls advise

## 2019-07-08 DIAGNOSIS — N50.89 SCROTAL MASS: Primary | ICD-10-CM

## 2020-03-10 ENCOUNTER — HOSPITAL ENCOUNTER (EMERGENCY)
Age: 45
Discharge: HOME | End: 2020-03-10
Attending: EMERGENCY MEDICINE
Payer: COMMERCIAL

## 2020-03-10 VITALS
WEIGHT: 150 LBS | SYSTOLIC BLOOD PRESSURE: 146 MMHG | HEART RATE: 88 BPM | BODY MASS INDEX: 22.22 KG/M2 | HEIGHT: 69 IN | TEMPERATURE: 98.4 F | DIASTOLIC BLOOD PRESSURE: 90 MMHG | OXYGEN SATURATION: 97 % | RESPIRATION RATE: 16 BRPM

## 2020-03-10 DIAGNOSIS — T14.8XXD DELAYED WOUND HEALING: Primary | ICD-10-CM

## 2020-03-10 DIAGNOSIS — S41.102A OPEN WOUND OF LEFT UPPER ARM, INITIAL ENCOUNTER: ICD-10-CM

## 2020-03-10 PROCEDURE — 87186 SC STD MICRODIL/AGAR DIL: CPT

## 2020-03-10 PROCEDURE — 99282 EMERGENCY DEPT VISIT SF MDM: CPT

## 2020-03-10 PROCEDURE — 87077 CULTURE AEROBIC IDENTIFY: CPT

## 2020-03-10 PROCEDURE — 87205 SMEAR GRAM STAIN: CPT

## 2020-03-10 RX ORDER — CEPHALEXIN 500 MG/1
500 CAPSULE ORAL 4 TIMES DAILY
Qty: 28 CAP | Refills: 0 | Status: SHIPPED | OUTPATIENT
Start: 2020-03-10 | End: 2020-03-17

## 2020-03-10 RX ORDER — SULFAMETHOXAZOLE AND TRIMETHOPRIM 800; 160 MG/1; MG/1
1 TABLET ORAL 2 TIMES DAILY
Qty: 14 TAB | Refills: 0 | Status: SHIPPED | OUTPATIENT
Start: 2020-03-10 | End: 2020-03-17

## 2020-03-10 NOTE — DISCHARGE INSTRUCTIONS
Patient Education        Wound Check: Care Instructions  Your Care Instructions  People have wounds that need care for many reasons. You may have a cut that needs care after surgery. You may have a cut or puncture wound from an accident. Or you may have a wound because of a condition like diabetes. Whatever the cause of your wound, there are things you can do to care for it at home. Your doctor may also want you to come back for a wound check. The wound check lets the doctor know how your wound is healing and if you need more treatment. Follow-up care is a key part of your treatment and safety. Be sure to make and go to all appointments, and call your doctor if you are having problems. It's also a good idea to know your test results and keep a list of the medicines you take. How can you care for yourself at home? · If your doctor told you how to care for your wound, follow your doctor's instructions. If you did not get instructions, follow this general advice:  ? You may cover the wound with a thin layer of petroleum jelly, such as Vaseline, and a nonstick bandage. ? Apply more petroleum jelly and replace the bandage as needed. · Keep the wound dry for the first 24 to 48 hours. After this, you can shower if your doctor okays it. Pat the wound dry. · Be safe with medicines. Read and follow all instructions on the label. ? If the doctor gave you a prescription medicine for pain, take it as prescribed. ? If you are not taking a prescription pain medicine, ask your doctor if you can take an over-the-counter medicine. · If your doctor prescribed antibiotics, take them as directed. Do not stop taking them just because you feel better. You need to take the full course of antibiotics. · If you have stitches, do not remove them on your own. Your doctor will tell you when to come back to have them removed. · If you have Steri-Strips, leave them on until they fall off.   · If possible, prop up the injured area on a pillow anytime you sit or lie down during the next 3 days. Try to keep it above the level of your heart. This will help reduce swelling. When should you call for help? Call your doctor now or seek immediate medical care if:    · You have new pain, or the pain gets worse.     · The skin near the wound is cold or pale or changes color.     · You have tingling, weakness, or numbness near the wound.     · The wound starts to bleed, and blood soaks through the bandage. Oozing small amounts of blood is normal.     · You have symptoms of infection, such as:  ? Increased pain, swelling, warmth, or redness. ? Red streaks leading from the wound. ? Pus draining from the wound. ? A fever.    Watch closely for changes in your health, and be sure to contact your doctor if:    · You do not get better as expected. Where can you learn more? Go to http://susana-lalita.info/. Enter P342 in the search box to learn more about \"Wound Check: Care Instructions. \"  Current as of: June 26, 2019  Content Version: 12.2  © 2291-5076 [employer]. Care instructions adapted under license by [employer] (which disclaims liability or warranty for this information). If you have questions about a medical condition or this instruction, always ask your healthcare professional. [address] any warranty or liability for your use of this information.

## 2020-03-10 NOTE — ED PROVIDER NOTES
EMERGENCY DEPARTMENT HISTORY AND PHYSICAL EXAM    8:15 AM      Date: 3/10/2020  Patient Name: Sarah Castro    History of Presenting Illness     Chief Complaint   Patient presents with    Arm Pain         History Provided By: Patient    Additional History (Context): Sarah Castro is a 40 y.o. male with history of heroin use who presents with complaints of left arm pain x 1 year. Patient reports that he has an abscess to his left arm which she has been picking at and attempting to care for at home for approximately 1 year. Patient reports history of multiple abscesses similar to this in the past, in which she has had \"cleaned up and bandaged, and and they went away\". Patient states his left arm pain is localized to the elbow and does not radiate. Movement is an aggravating factor, there are no alleviating factors. Patient denies any fevers or chills. PCP: None        Past History     Past Medical History:  History reviewed. No pertinent past medical history. Past Surgical History:  Past Surgical History:   Procedure Laterality Date    EGD  6/23/2015            Family History:  Family History   Problem Relation Age of Onset    Diabetes Mother     Cancer Father        Social History:  Social History     Tobacco Use    Smoking status: Current Every Day Smoker     Packs/day: 1.00     Years: 30.00     Pack years: 30.00    Smokeless tobacco: Never Used    Tobacco comment: Patient states that he would to quit   Substance Use Topics    Alcohol use: Not Currently     Frequency: Never     Comment: quit 2004    Drug use: Yes     Types: Heroin, Marijuana     Comment: last used 4/21/15       Allergies:  No Known Allergies      Review of Systems       Review of Systems   Constitutional: Negative. HENT: Negative. Respiratory: Negative. Cardiovascular: Negative. Gastrointestinal: Negative. Genitourinary: Negative. Skin: Positive for color change and wound. Neurological: Negative.     All other systems reviewed and are negative. Physical Exam     Visit Vitals  /90 (BP 1 Location: Right arm, BP Patient Position: At rest)   Pulse 88   Temp 98.4 °F (36.9 °C)   Resp 16   Ht 5' 9\" (1.753 m)   Wt 68 kg (150 lb)   SpO2 97%   BMI 22.15 kg/m²         Physical Exam  Vitals signs reviewed. Constitutional:       General: He is not in acute distress. Appearance: Normal appearance. He is not ill-appearing or toxic-appearing. HENT:      Head: Normocephalic and atraumatic. Right Ear: External ear normal.      Left Ear: External ear normal.      Nose: Nose normal.      Mouth/Throat:      Mouth: Mucous membranes are moist.      Pharynx: Oropharynx is clear. Eyes:      Extraocular Movements: Extraocular movements intact. Neck:      Musculoskeletal: Neck supple. Cardiovascular:      Rate and Rhythm: Normal rate and regular rhythm. Pulses: Normal pulses. Heart sounds: Normal heart sounds. No murmur. No gallop. Pulmonary:      Effort: Pulmonary effort is normal.      Breath sounds: Normal breath sounds. No wheezing, rhonchi or rales. Skin:     General: Skin is warm and dry. Capillary Refill: Capillary refill takes less than 2 seconds. Comments: Large area of ulceration measuring approximately 7.5 cm x 4.5 cm to the left lateral elbow. There is mild bloody discharge and slough noted to the wound. Neurological:      General: No focal deficit present. Mental Status: He is alert and oriented to person, place, and time. Cranial Nerves: No cranial nerve deficit. Sensory: No sensory deficit. Motor: No weakness. Diagnostic Study Results     Labs -  No results found for this or any previous visit (from the past 12 hour(s)). Radiologic Studies -   No orders to display         Medical Decision Making   I am the first provider for this patient.     I reviewed the vital signs, available nursing notes, past medical history, past surgical history, family history and social history. Vital Signs-Reviewed the patient's vital signs. Records Reviewed: Nursing Notes and Old Medical Records (Time of Review: 8:15 AM)    ED Course: Progress Notes, Reevaluation, and Consults:  8:15 AM met with patient, reviewed history, performed physical exam.  Physical exam reveals a large area of ulceration to the lateral aspect of the left elbow. There is associated erythema and induration surrounding the ulceration. Ulceration measures approximately 7.5 cm x 4.5 cm. Lengthy discussion with the patient regarding potential admission, given the severity of the ulceration to his left arm. Patient adamantly refused admission, states that he has animals to care for at home, stating that he is unable to be admitted for the care that he needs. Patient states he understands he needs antibiotics and would benefit from admission, but again refuses admission. Patient states that he would just like the wound cleaned and to be placed on antibiotics. Current plan is to clean the wound and obtain wound cultures for sensitivity and specificity. Patient's vital signs are stable, he is nontoxic in appearance. Provider Notes (Medical Decision Making):   70-year-old male seen in the emergency department for complaints of arm pain. Physical exam as noted above, with a large ulceration to the left lateral elbow. Patient refused further work-up or admission. A wound culture was obtained, the wound was cleaned and redressed, the patient will be discharged on oral antibiotics. Patient will be given information regarding follow-up with primary care provider for further care. Patient advised to return to the ED if symptoms worsen, or as needed. Diagnosis     Clinical Impression:   1. Delayed wound healing    2.  Open wound of left upper arm, initial encounter        Disposition: home     Follow-up Information     Follow up With Specialties Details Why [address] [facility]  Call in 1 day For follow up regarding ER visit. [address] [facility]  [address] Call in 1 day For follow up regarding ER visit. [id number] [facility] EMERGENCY DEPT Emergency Medicine  As needed, Immediately if symptoms worsen. [address]  [phone]           Patient's Medications   Start Taking    CEPHALEXIN (KEFLEX) 500 MG CAPSULE    Take 1 Cap by mouth four (4) times daily for 7 days. TRIMETHOPRIM-SULFAMETHOXAZOLE (BACTRIM DS) 160-800 MG PER TABLET    Take 1 Tab by mouth two (2) times a day for 7 days. Continue Taking    No medications on file   These Medications have changed    No medications on file   Stop Taking    METHADONE (DOLOPHINE) 10 MG/ML SOLUTION    Take 50 mg by mouth. Zoya Vargas PA-C    Dictation disclaimer:  Please note that this dictation was completed with [employer], the computer voice recognition software. Quite often unanticipated grammatical, syntax, homophones, and other interpretive errors are inadvertently transcribed by the computer software. Please disregard these errors. Please excuse any errors that have escaped final proofreading.

## 2020-03-13 LAB
BACTERIA SPEC CULT: ABNORMAL
BACTERIA SPEC CULT: ABNORMAL
GRAM STN SPEC: ABNORMAL
GRAM STN SPEC: ABNORMAL
SERVICE CMNT-IMP: ABNORMAL

## 2024-06-27 NOTE — H&P
PT NEEDS 12.5 MG.... HE TRIED CUTTING IN HALF THE 25 BUT THEY JUST BREAK APART.    [facility] WOUND CARE INITIAL/ follow up NOTE      Patient: Lexa Esqueda MRN: [id number]  SSN: [id number]    YOB: 1975  Age: 43 y.o. Sex: male      Primary Care Provider:  None  Date of Visit: 9/22/2017    Assessment : Follow up Visit Week:     PLAN / RECCOMENDATIONS:    FIRST VISIT on 9-22-17    ASSESSMENT:    1. Left Lower arm  ( Below Elbow joint )non healing deep Ulcer X 18 months. Upper part with Ecthyma- Infection + Vasculitis        Lower part with visible and palpable bone        Has been injecting Heroin into the area regularly        X ray on 9-10 Prominent cellulitis and myositis throughout the left forearm. Concerning  periosteal reaction involving the proximal aspects of the left radius and ulna,  likely evidence of osteomyelitis. MRI could better delineate suspected  involvement of osteomyelitis  Wound Cxs on 9-10 - E.coli ,P.aeruginosa  Blood Cx on 9-10  E. Coli  Received 10 days of Ciprofloxacin fro ED       Osteomyelitis - Proximal radius and ulna    2. Healed RT Lower arm Wound- IVDU    3 TCH Smoker    WOUND POA CONDITIONS    Wound Arm Left (Active)   DRESSING STATUS Removed 9/22/2017 11:34 AM   DRESSING TYPE Other (Comment) 9/22/2017 11:34 AM   Non-Pressure Injury Full thickness (subcut/muscle) 9/22/2017 11:34 AM   Wound Length (cm) 8 cm 9/22/2017 11:34 AM   Wound Width (cm) 2.2 cm 9/22/2017 11:34 AM   Wound Depth (cm) 0.9 9/22/2017 11:34 AM   Wound Surface area (cm^3) 15.84 cm^2 9/22/2017 11:34 AM   Condition of Base Pink;Slough 9/22/2017 11:34 AM   Condition of Edges Open 9/22/2017 11:34 AM   Tissue Type Pink;Yellow 9/22/2017 11:34 AM   Tissue Type Percent Pink 5 9/22/2017 11:34 AM   Tissue Type Percent Yellow 95 9/22/2017 11:34 AM   Drainage Amount  Moderate 9/22/2017 11:34 AM   Drainage Color Purulent 9/22/2017 11:34 AM   Wound Odor None 9/22/2017 11:34 AM   Periwound Skin Condition Other (comment) 9/22/2017 11:34 AM   Cleansing and Cleansing Agents  Dermal wound cleanser 9/22/2017 11:34 AM   Number of days:0                   PLAN /RECOMMENDATIONS:    Wound Care  Debridement   Deep Cx  Continue Ciprofloxacin - 750 mg Po Q 12 hrs for 6 Weeks for osteomyelitis. Cr - 0.75  Iodosorb based dressing with foam cover  RTC in 2 weeks    PROCEDURE NOTE:        EXCISIONAL DEBRIDEMENT NOTE    Selective sharp instrument debridement of slough and devitilized tissue    After the benefits/risks/SE were discussed, the patient agreed to proceed. Time out was done:   * Patient was identified by name and date of birth   * Agreement on procedure being performed was verified   * Procedure site verified and marked as necessary   * Patient was positioned for comfort   * Consent was signed and verified. Site: Left Lower Arm    Instruments used:    [x]  Dermal curette  [] Blade        [] #15  [x] #10  [x] Forceps  [] Tissue nippers  [] sterile scissors  [] Other     Anesthesia:    [x]  EMLA 2.5% cream: applied to wound beds for approximately 15minutes. []   Lidocaine 2% Topical Gel      []  Lidocaine injectable 1% with epinephrine 1:100,000    []  Lidocaine injectable 1% without epinephrine    []  Other:     []  None         [] patient is insensate due to neuropathy         [] patient declines        [] allergy to anesthetic        [] tissue for debridement is either superficial, loosely adherent and/or necrotic and denervated     After satisfactory anesthesia achieved, the wound/s was/were sharply excisional debrided necrotic, devitalized and granulation tissue down to the bone layer, revealing a clean and viable wound bed. Post debridement measurement was ___x_0.5 cm larger____x___cm . Bleeding: <5mL Resolved with light focal pressure. Wounds were cleaned and irrigated with saline.      Wound care applied:   []   Hydrogell   []  Hypergel   []   Hydrofiber/Aquacel      [x]   Cadexomer Iodine (Iodosorb)   []  Silver Alginate    []   Medihoney:    []   Collagenase:Santyl   []  Calcium Alginate   []   Collagen:    []   Foam   []  Non-Adherent Contact Layer   []   Xeroform    []   Adaptec:   []   Hydrocolloid   []   Transparent Film    []   Promogran   []   Apurva   []   Promogran       []  Antibiotic ointment/cream  []   Wound VAC   []   Other (see below)     Other:     []   Dry Gauze and Roll Gauze    [x]   Foam and Roll Gauze    []   Dry Gauze    []    Bordered gauze:     []   Secure with Tape    []   Other      []   Compression Wrap:          []   Unna Boot    []Multi-Layer    [x]Tubular Bandages       Sara-Ulcer Care    []   Cream     []   Lotion     []   Ointment     []   Barrier     []   Other:       The patient tolerated the procedure well with no complications. The patient left the exam room in satisfactory and stable condition. Lynne Barnhart MD        FIRST VISIT DATA: on   9-22-17  Reason for consult:   Sherry Duncan is 43 y.o. male on whom Wound Care Service is being consulted for nonhealing Left arm wound    History of Present Illness: On First Visit  In Jan 2016 developed IVDU related wounds on both lower arms. RT has healed. Left has been worsening  To ED on 9-10, Xray and Cxs as noted above. States has not injected for 2 weeks but smokes TCH every day  Denies fever , chills or other systemic symptoms. States he\" makes a living\". Patient Active Problem List   Diagnosis Code    Nausea & vomiting R11.2     No Known Allergies   History reviewed. No pertinent past medical history. Past Surgical History:   Procedure Laterality Date    EGD  6/23/2015          Social History   Substance Use Topics    Smoking status: Current Every Day Smoker    Smokeless tobacco: Never Used    Alcohol use No     Prior to Admission medications    Not on File     Family History - Cancer in father and [address] father    There are no discontinued medications. No current outpatient prescriptions on file. No current facility-administered medications for this encounter.               MICROBIOLOGY:- Culture result:   Date Value Ref Range Status   09/10/2017 NO GROWTH 6 DAYS   Final   09/10/2017 MANY PSEUDOMONAS AERUGINOSA (A)   Final   09/10/2017 MANY PSEUDOMONAS AERUGINOSA 2ND MORPHOTYPE (A)   Final   09/10/2017 MODERATE ESCHERICHIA COLI (A)   Final   09/10/2017 FEW DIPHTHEROIDS (A)   Final     GRAM STAIN   Date Value Ref Range Status   09/10/2017 MANY GRAM NEGATIVE RODS   Final   09/10/2017 NO WBC'S SEEN   Final   09/10/2017   Final    CALLED TO GUERO EDNSON IN ER ON [id number] AT 1205 BY [facility]         Antibiotic History:    Current:     S/P: Cipro    Radiology:  [unfilled]      Review of Systems:     Constitutional: negative for chills, diaphoresis, fever, malaise/fatigue, weakness and weight loss   Skin: negative for itching and rash   HENT: negative for ear discharge, ear pain, hearing loss and sore throat   Eyes: negative for blurred vision, double vision and photophobia   Cardiovascular: negative for chest pain, claudication, leg swelling, orthopnea, paroxysmal nocturnal dyspnea   Respiratory: negative for cough, hemoptysis, shortness of breath or sputum production   Gastointestinal: negative for abdominal pain, blood in stool, constipation, diarrhea, melena, nausea and vomiting   Genitourinary: No dysuria, increased frequency, hematuria   Musculoskeletal: negative for back pain, joint pain and myalgias   Endo: negative for cold intolerance, heat intolerance, polydipsia and polyuria.    Heme: negative for easy bleeding and easy bruising   Allergies: negative for hives   Neurological: negative for headaches, dizziness, focal weakness, level of consciousness, seizures and tingling   Psychiatric:  negative for depression, hallucinations and suicidal ideals       Objective:     Patient Vitals for the past 24 hrs:   Temp Pulse Resp BP SpO2   09/22/17 1154 97 °F (36.1 °C) 97 15 131/77 100 %       Constitutional: well developed, nourished, no distress and alert and oriented x 3   HENT: atraumatic, nose normal, normocephalic and oropharynx clear and moist   Eyes: conjunctiva normal, EOM normal and PERRL   Neck: ROM normal, supple, trachea normal and no adenopathy, thrush.  MMM   Cardiovascular: heart sounds normal, intact distal pulses, normal rate and regular rhythm   Pulmonary/Chest Wall: breath sounds normal and effort normal   Abdominal: appearance normal, bowel sounds normal, soft and No rebound, gaurding or tenderness   Genitourinary/Anorectal: deferred   Musculoskeletal: normal ROM   Neurological: awake, alert and oriented x 3, and   cranial nerves intact  muscular strength 5/5 and symmetric  reflexes normal and symmetric  sensory normal       Skin: dry, intact and warm     Left arm wound as noted above       Labwork and Ancillary Studies   CBC w/Diff  Lab Results   Component Value Date/Time    WBC 9.0 09/10/2017 11:10 AM    RBC 4.70 09/10/2017 11:10 AM    HCT 40.2 09/10/2017 11:10 AM    MCV 85.5 09/10/2017 11:10 AM    MCH 29.8 09/10/2017 11:10 AM    MCHC 34.8 09/10/2017 11:10 AM    RDW 13.1 09/10/2017 11:10 AM    MONOS 6 09/10/2017 11:10 AM    EOS 1 09/10/2017 11:10 AM    BASOS 0 09/10/2017 11:10 AM       Comprehensive Metabolic Profile  Lab Results   Component Value Date/Time     09/10/2017 11:10 AM    CO2 28 09/10/2017 11:10 AM    BUN 14 09/10/2017 11:10 AM         Zulema Venegas MD  Pager: [phone] Wound Care Staff